# Patient Record
Sex: MALE | Race: WHITE
[De-identification: names, ages, dates, MRNs, and addresses within clinical notes are randomized per-mention and may not be internally consistent; named-entity substitution may affect disease eponyms.]

---

## 2019-03-25 ENCOUNTER — HOSPITAL ENCOUNTER (EMERGENCY)
Dept: HOSPITAL 7 - FB.ED | Age: 64
Discharge: HOME | End: 2019-03-25
Payer: COMMERCIAL

## 2019-03-25 DIAGNOSIS — I10: ICD-10-CM

## 2019-03-25 DIAGNOSIS — Z79.899: ICD-10-CM

## 2019-03-25 DIAGNOSIS — R07.89: Primary | ICD-10-CM

## 2019-03-25 DIAGNOSIS — E78.00: ICD-10-CM

## 2019-03-25 DIAGNOSIS — Z91.030: ICD-10-CM

## 2019-03-25 DIAGNOSIS — Z88.8: ICD-10-CM

## 2019-03-25 NOTE — EDM.PDOC
ED HPI GENERAL MEDICAL PROBLEM





- General


Chief Complaint: Chest Pain


Stated Complaint: CHEST PAIN


Time Seen by Provider: 03/25/19 16:04


Source of Information: Reports: Patient


History Limitations: Reports: No Limitations





- History of Present Illness


INITIAL COMMENTS - FREE TEXT/NARRATIVE: 





Awoke with right sided sharp burning chest pain, associated with slight 

shortness of breath, denies cough or injury, not pleuritic. No prior h/o CAD. +

FMHx CAD (Mother and Father with CAD in their 60's).


Onset: Today


Onset Date: 03/25/19


Onset Time: 06:00


Location: Reports: Chest (right)


Quality: Reports: Burning, Sharp


Severity: Moderate


Associated Symptoms: Reports: Shortness of Breath (slight)


  ** Right Chest


Pain Score (Numeric/FACES): 9





- Related Data


 Allergies











Allergy/AdvReac Type Severity Reaction Status Date / Time


 


bee venom protein (honey bee) Allergy  Anaphylactic Verified 03/25/19 15:55





   Shock  


 


flomax Allergy  Chest Pain Uncoded 03/25/19 15:55











Home Meds: 


 Home Meds





Fluticasone Propionate [Flonase] 1 spray NASBOTH DAILY PRN 10/19/14 [History]


atorvaSTATin [Lipitor] 40 mg PO BEDTIME 10/19/14 [History]


hydroCHLOROthiazide [Hydrochlorothiazide] 25 mg PO BID 10/19/14 [History]


Cyclobenzaprine [Flexeril] 10 mg PO TID PRN #20 tab 03/25/19 [Rx]


Naproxen 500 mg PO BID PRN #20 tablet 03/25/19 [Rx]











Past Medical History


Cardiovascular History: Reports: High Cholesterol, Hypertension.  Denies: CAD, 

MI





Social & Family History





- Tobacco Use


Smoking Status *Q: Never Smoker





ED ROS GENERAL





- Review of Systems


Review Of Systems: ROS reveals no pertinent complaints other than HPI.





ED EXAM, GENERAL





- Physical Exam


Exam: See Below


Exam Limited By: No Limitations


General Appearance: Alert, WD/WN, No Apparent Distress


Ears: Normal External Exam


Nose: Normal Inspection


Throat/Mouth: No Airway Compromise


Head: Atraumatic, Normocephalic


Neck: Full Range of Motion


Respiratory/Chest: No Respiratory Distress, Lungs Clear, Normal Breath Sounds, 

Other (moderate right sided chest wall tenderness)


Cardiovascular: Regular Rate, Rhythm, No Edema, No Murmur


GI/Abdominal: No Distention


Back Exam: Full Range of Motion


Extremities: Normal Range of Motion, Non-Tender, No Pedal Edema


Neurological: Alert, Normal Cognition, No Motor/Sensory Deficits


Psychiatric: Normal Affect, Normal Mood


Skin Exam: Warm, Dry, Intact





EKG INTERPRETATION


EKG Date: 03/25/19


Time: 15:50


Rhythm: NSR


Rate (Beats/Min): 72


Axis: Normal


P-Wave: Present


QRS: Normal


ST-T: Other (Borderline T wave abnormality)


QT: Normal


Comparison: Other: (EKG @19:08 shows no changes)





Course





- Vital Signs


Last Recorded V/S: 


 Last Vital Signs











Temp  36.4 C   03/25/19 15:56


 


Pulse  71   03/25/19 15:56


 


Resp  20   03/25/19 15:56


 


BP  121/85   03/25/19 15:56


 


Pulse Ox  100   03/25/19 15:56














- Orders/Labs/Meds


Orders: 


 Active Orders 24 hr











 Category Date Time Status


 


 EKG Documentation Completion [RC] ASDIRECTED Care  03/25/19 15:59 Active


 


 EKG Documentation Completion [RC] ASDIRECTED Care  03/25/19 19:00 Active


 


 CXR [Chest 1V Frontal] [CR] Stat Exams  03/25/19 15:58 Taken


 


 Sodium Chloride 0.9% [Saline Flush] Med  03/25/19 15:59 Active





 10 ml FLUSH ASDIRECTED PRN   


 


 Saline Lock Insert [OM.PC] Routine Oth  03/25/19 15:59 Ordered


 


 EKG 12 Lead [EK] Stat Ther  03/25/19 15:58 Ordered


 


 EKG 12 Lead [EK] Urgent Ther  03/25/19 19:00 Ordered








 Medication Orders





Sodium Chloride (Saline Flush)  10 ml FLUSH ASDIRECTED PRN


   PRN Reason: Keep Vein Open








Labs: 


 Laboratory Tests











  03/25/19 03/25/19 03/25/19 Range/Units





  16:20 16:20 16:20 


 


WBC  6.2    (4.5-12.0)  X10-3/uL


 


RBC  5.50    (4.30-5.75)  x10(6)uL


 


Hgb  17.4    (13.5-17.8)  g/dL


 


Hct  50.3    (30.0-51.3)  %


 


MCV  91.4    (80-96)  fL


 


MCH  31.7    (27.7-33.6)  pg


 


MCHC  34.7    (32.2-35.4)  g/dL


 


RDW  12.4    (11.5-15.5)  %


 


Plt Count  189    (125-369)  X10(3)uL


 


MPV  9.0    (7.4-10.4)  fL


 


Neut % (Auto)  47.4    (46-82)  %


 


Lymph % (Auto)  35.1    (13-37)  %


 


Mono % (Auto)  13.8 H    (4-12)  %


 


Eos % (Auto)  2    (1.0-5.0)  %


 


Baso % (Auto)  1    (0-2)  %


 


Neut # (Auto)  2.9    (1.6-8.3)  #


 


Lymph # (Auto)  2.2    (0.6-5.0)  #


 


Mono # (Auto)  0.9    (0.0-1.3)  #


 


Eos # (Auto)  0.1    (0.0-0.8)  #


 


Baso # (Auto)  0.1    (0.0-0.2)  #


 


PT   10.5   (8.7-11.1)  


 


INR   1.08   (0.89-1.13)  


 


APTT   24.5   (24.4-33.2)  SECONDS


 


D-Dimer, Quantitative   0.26   (0.0-0.59)  mg/LFEU


 


Sodium    139  (135-145)  mmol/L


 


Potassium    3.6  (3.5-5.3)  mmol/L


 


Chloride    98 L  (100-110)  mmol/L


 


Carbon Dioxide    31  (21-32)  mmol/L


 


BUN    20 H  (7-18)  mg/dL


 


Creatinine    1.1  (0.70-1.30)  mg/dL


 


Est Cr Clr Drug Dosing    TNP  


 


Estimated GFR (MDRD)    > 60  (>60)  


 


BUN/Creatinine Ratio    18.2  (9-20)  


 


Glucose    96  ()  mg/dL


 


Calcium    9.0  (8.6-10.2)  mg/dL


 


Total Bilirubin    0.9  (0.1-1.3)  mg/dL


 


AST    23  (5-25)  IU/L


 


ALT    42 H  (12-36)  U/L


 


Alkaline Phosphatase    78  ()  IU/L


 


Troponin I     (<0.017-0.056)  ng/mL


 


Total Protein    7.1  (6.0-8.0)  g/dL


 


Albumin    3.9  (3.2-4.6)  g/dL


 


Globulin    3.2  g/dL


 


Albumin/Globulin Ratio    1.2  














  03/25/19 03/25/19 Range/Units





  16:20 19:20 


 


WBC    (4.5-12.0)  X10-3/uL


 


RBC    (4.30-5.75)  x10(6)uL


 


Hgb    (13.5-17.8)  g/dL


 


Hct    (30.0-51.3)  %


 


MCV    (80-96)  fL


 


MCH    (27.7-33.6)  pg


 


MCHC    (32.2-35.4)  g/dL


 


RDW    (11.5-15.5)  %


 


Plt Count    (125-369)  X10(3)uL


 


MPV    (7.4-10.4)  fL


 


Neut % (Auto)    (46-82)  %


 


Lymph % (Auto)    (13-37)  %


 


Mono % (Auto)    (4-12)  %


 


Eos % (Auto)    (1.0-5.0)  %


 


Baso % (Auto)    (0-2)  %


 


Neut # (Auto)    (1.6-8.3)  #


 


Lymph # (Auto)    (0.6-5.0)  #


 


Mono # (Auto)    (0.0-1.3)  #


 


Eos # (Auto)    (0.0-0.8)  #


 


Baso # (Auto)    (0.0-0.2)  #


 


PT    (8.7-11.1)  


 


INR    (0.89-1.13)  


 


APTT    (24.4-33.2)  SECONDS


 


D-Dimer, Quantitative    (0.0-0.59)  mg/LFEU


 


Sodium    (135-145)  mmol/L


 


Potassium    (3.5-5.3)  mmol/L


 


Chloride    (100-110)  mmol/L


 


Carbon Dioxide    (21-32)  mmol/L


 


BUN    (7-18)  mg/dL


 


Creatinine    (0.70-1.30)  mg/dL


 


Est Cr Clr Drug Dosing    


 


Estimated GFR (MDRD)    (>60)  


 


BUN/Creatinine Ratio    (9-20)  


 


Glucose    ()  mg/dL


 


Calcium    (8.6-10.2)  mg/dL


 


Total Bilirubin    (0.1-1.3)  mg/dL


 


AST    (5-25)  IU/L


 


ALT    (12-36)  U/L


 


Alkaline Phosphatase    ()  IU/L


 


Troponin I  < 0.017 L  < 0.017 L  (<0.017-0.056)  ng/mL


 


Total Protein    (6.0-8.0)  g/dL


 


Albumin    (3.2-4.6)  g/dL


 


Globulin    g/dL


 


Albumin/Globulin Ratio    











Meds: 


Medications











Generic Name Dose Route Start Last Admin





  Trade Name Freq  PRN Reason Stop Dose Admin


 


Sodium Chloride  10 ml  03/25/19 15:59  





  Saline Flush  FLUSH   





  ASDIRECTED PRN   





  Keep Vein Open   





     





     





     














Discontinued Medications














Generic Name Dose Route Start Last Admin





  Trade Name Freq  PRN Reason Stop Dose Admin


 


Aspirin  324 mg  03/25/19 16:03  03/25/19 16:30





  Aspirin  PO  03/25/19 16:04  324 mg





  ONETIME ONE   Administration





     





     





     





     


 


Cyclobenzaprine HCl  10 mg  03/25/19 17:06  03/25/19 17:24





  Flexeril  PO  03/25/19 17:07  10 mg





  ONETIME ONE   Administration





     





     





     





     


 


Ketorolac Tromethamine  30 mg  03/25/19 16:03  03/25/19 16:30





  Toradol  IVPUSH  03/25/19 16:04  30 mg





  ONETIME ONE   Administration





     





     





     





     














- Radiology Interpretation


Free Text/Narrative:: 





CXR: No acute process. (ED provider interpretation)





- Re-Assessments/Exams


Free Text/Narrative Re-Assessment/Exam: 





03/25/19 17:07


Symptoms improved after Toradol 30mg IV and ASA 324mg PO


03/25/19 19:49


Symptoms recurred, right chest wall area very tender to palpation.





Departure





- Departure


Time of Disposition: 19:51


Disposition: Home, Self-Care 01


Condition: Good


Clinical Impression: 


 Chest wall pain





Prescriptions: 


Cyclobenzaprine [Flexeril] 10 mg PO TID PRN #20 tab


 PRN Reason: Muscle Spasm


Naproxen 500 mg PO BID PRN #20 tablet


 PRN Reason: Pain


Instructions:  Chest Wall Pain


Referrals: 


Griffin King MD [Primary Care Provider] - 2 Days


Forms:  ED Department Discharge


Additional Instructions: 


Fill prescriptions for Naproxen and Flexeril and take as directed. Rest. 

Alternate Ice and Heat. Follow up with your primary physician in 2 days. Return 

to the ER if symptoms worsen.





- My Orders


Last 24 Hours: 


My Active Orders





03/25/19 15:58


CXR [Chest 1V Frontal] [CR] Stat 


EKG 12 Lead [EK] Stat 





03/25/19 15:59


EKG Documentation Completion [RC] ASDIRECTED 


Sodium Chloride 0.9% [Saline Flush]   10 ml FLUSH ASDIRECTED PRN 


Saline Lock Insert [OM.PC] Routine 





03/25/19 19:00


EKG Documentation Completion [RC] ASDIRECTED 


EKG 12 Lead [EK] Urgent 














- Assessment/Plan


Last 24 Hours: 


My Active Orders





03/25/19 15:58


CXR [Chest 1V Frontal] [CR] Stat 


EKG 12 Lead [EK] Stat 





03/25/19 15:59


EKG Documentation Completion [RC] ASDIRECTED 


Sodium Chloride 0.9% [Saline Flush]   10 ml FLUSH ASDIRECTED PRN 


Saline Lock Insert [OM.PC] Routine 





03/25/19 19:00


EKG Documentation Completion [RC] ASDIRECTED 


EKG 12 Lead [EK] Urgent

## 2019-03-27 NOTE — CR
INDICATION:  Chest pain.



CHEST:  A portable AP upright view of the chest was obtained 03/25/19 - no 
comparisons.  



The heart appeared to be near the upper limits of normal in size but may be 
within normal limits.  The aorta is minimally calcified in the arch area.  



Overlying EKG leads are noted. 



An active infiltrate or effusion was not identified.  Although markings are 
slightly heavy at the lung bases and likely represent pulmonary fibrosis, it is 
difficult to entirely exclude minimal patchy bronchopneumonia.



Also noted are heavy markings in the apical lung, especially on the right, 
likely fibrotic in nature.  Pneumonia in the right apical area is difficult to 
entirely exclude also.  



IMPRESSION:

1.  No definite acute process but difficult to entirely exclude minimal areas 
of patchy pneumonia in the lung bases and the right apical area.

2.  Probable ASHD.

3.  Minimal dextroconvex scoliosis upper middle thoracic spine.  

4.  Probable pulmonary fibrosis.

MTDD

## 2019-11-12 ENCOUNTER — HOSPITAL ENCOUNTER (EMERGENCY)
Dept: HOSPITAL 7 - FB.ED | Age: 64
Discharge: HOME | End: 2019-11-12
Payer: COMMERCIAL

## 2019-11-12 VITALS — DIASTOLIC BLOOD PRESSURE: 78 MMHG | HEART RATE: 75 BPM | SYSTOLIC BLOOD PRESSURE: 124 MMHG

## 2019-11-12 DIAGNOSIS — J18.1: Primary | ICD-10-CM

## 2019-11-12 DIAGNOSIS — Z79.899: ICD-10-CM

## 2019-11-12 DIAGNOSIS — Z91.030: ICD-10-CM

## 2019-11-12 DIAGNOSIS — I10: ICD-10-CM

## 2019-11-12 DIAGNOSIS — Z88.8: ICD-10-CM

## 2019-11-12 DIAGNOSIS — E78.00: ICD-10-CM

## 2019-11-12 NOTE — EDM.PDOC
ED HPI GENERAL MEDICAL PROBLEM





- General


Chief Complaint: Chest Pain


Stated Complaint: CHEST PAIN


Time Seen by Provider: 11/12/19 13:37


Source of Information: Reports: Patient, Family, Old Records


History Limitations: Reports: No Limitations





- History of Present Illness


INITIAL COMMENTS - FREE TEXT/NARRATIVE: 





Raimundo comes into [facility] ED with relapses of atypical precordial chest pains, 

significance unknown. He has sporadic relapses that seem to migrate from L to R 

accross the chest anteriorly, sharp and disabling. At times he feels SOB, but 

denies palpitations, sweats, GI upset, lt headiness, or LOC. More recently sxs 

occurred following a half day of deer hunting this weekend while walking back 

to the vehicles. Sxs lasted several hours, but did subside without sequelae. 

There is no trauma hx. He has known deg Lumbar disc disease. He has taken no 

meds. Of interest is a strong FMS of CAD. He was seen by cardiologist about 17 

years ago, with no follow up. 





- Related Data


 Allergies











Allergy/AdvReac Type Severity Reaction Status Date / Time


 


bee venom protein (honey bee) Allergy  Anaphylactic Verified 03/25/19 15:55





   Shock  


 


flomax Allergy  Chest Pain Uncoded 03/25/19 15:55











Home Meds: 


 Home Meds





Fluticasone Propionate [Flonase] 1 spray NASBOTH DAILY PRN 10/19/14 [History]


atorvaSTATin [Lipitor] 40 mg PO BEDTIME 10/19/14 [History]


hydroCHLOROthiazide [Hydrochlorothiazide] 25 mg PO BID 10/19/14 [History]


Donepezil [Aricept] 5 mg PO BEDTIME 11/12/19 [History]


Hydrocodone/Acetaminophen [Hydrocodon-Acetaminophen 5-325] 1 tab PO Q6HR PRN 11/ 12/19 [History]


Levofloxacin [Levaquin] 500 mg PO DAILY #14 tablet 11/12/19 [Rx]


atorvaSTATin [Lipitor] 40 mg PO DAILY 11/12/19 [History]











Past Medical History


HEENT History: Reports: Allergic Rhinitis


Cardiovascular History: Reports: High Cholesterol, Hypertension.  Denies: CAD, 

MI


Other Cardiovascular History: patient states he has 50% blockage in one of his 

coronary arteries.


Respiratory History: Reports: None


Gastrointestinal History: Reports: None


Genitourinary History: Reports: Renal Calculus


Musculoskeletal History: Reports: Back Pain, Chronic


Neurological History: Reports: None


Psychiatric History: Reports: None


Endocrine/Metabolic History: Reports: None


Hematologic History: Reports: None


Immunologic History: Reports: None


Oncologic (Cancer) History: Reports: None


Dermatologic History: Reports: None





- Past Surgical History


Head Surgeries/Procedures: Reports: None


HEENT Surgical History: Reports: None


Respiratory Surgical History: Reports: None


GI Surgical History: Reports: None


Male  Surgical History: Reports: Kidney Stone Extraction


Endocrine Surgical History: Reports: None


Neurological Surgical History: Reports: None


Musculoskeletal Surgical History: Reports: None


Oncologic Surgical History: Reports: None


Dermatological Surgical History: Reports: None





ED ROS GENERAL





- Review of Systems


Review Of Systems: See Below


Constitutional: Reports: No Symptoms


HEENT: Reports: No Symptoms


Respiratory: Reports: Shortness of Breath (intermittent with chest pains)


Cardiovascular: Reports: Chest Pain


Endocrine: Reports: No Symptoms


GI/Abdominal: Reports: No Symptoms


: Reports: No Symptoms


Musculoskeletal: Reports: Back Pain (chronic back pain)


Skin: Reports: No Symptoms


Neurological: Reports: No Symptoms


Psychiatric: Reports: No Symptoms


Hematologic/Lymphatic: Reports: No Symptoms


Immunologic: Reports: No Symptoms





ED EXAM, GENERAL





- Physical Exam


Exam: See Below


Exam Limited By: No Limitations


General Appearance: Alert, WD/WN, No Apparent Distress, Anxious


Eye Exam: Bilateral Eye: EOMI, Normal Inspection, PERRL


Ears: Normal External Exam


Nose: Normal Inspection


Throat/Mouth: Normal Inspection, Normal Lips, Normal Teeth, Normal Gums, Normal 

Oropharynx, Normal Voice, No Airway Compromise


Head: Normocephalic


Neck: Normal Inspection, Supple, Non-Tender, Full Range of Motion


Respiratory/Chest: No Respiratory Distress, Normal Breath Sounds, No Accessory 

Muscle Use, Other (limited chest tenderness at xiphoid, compression test neg)


Cardiovascular: Normal Peripheral Pulses, Regular Rate, Rhythm, No Murmur


GI/Abdominal: Normal Bowel Sounds, Soft, Non-Tender, No Organomegaly, No 

Distention, No Mass


 (Male) Exam: Deferred


Rectal (Males) Exam: Deferred


Back Exam: Vertebral Tenderness (L2-5 R>L)


Extremities: Normal Inspection, Normal Range of Motion


Neurological: Alert, Oriented, CN II-XII Intact, Normal Cognition, No Motor/

Sensory Deficits


Psychiatric: Normal Affect, Anxious


Skin Exam: Warm, Dry, Intact


Lymphatic: No Adenopathy





Course





- Vital Signs


Text/Narrative:: 





Following assessment, a 12 lead EKG noted NSR, no acute changes. A subsequent 

frontal headache was reported, managed with Toradaol 30 mg IM. The screening 

labs and ekg were  baseline, with inflammatory markers negative. I reviewed the 

Chest CT angio which noted no PE, but did note pneumonic infiltrates in the R 

upper lobe, with some fluid in the R middle lobe fissue, signifcance unknown. A 

pneumonia is suspected, but an underlying disorder could not be ruled out at 

this time. Case was discussed with patient and spouse, and empiric treatment 

with Levaquin 500 mg qd will begin, with follow up with PCP on November 27th. A 

repeat CT Chest would be considered in 3-4 weeks. 


Last Recorded V/S: 


 Last Vital Signs











Temp  36.6 C   11/12/19 13:10


 


Pulse  75   11/12/19 13:10


 


Resp  14   11/12/19 13:10


 


BP  124/78   11/12/19 13:10


 


Pulse Ox  100   11/12/19 13:10














- Orders/Labs/Meds


Orders: 


 Active Orders 24 hr











 Category Date Time Status


 


 EKG Documentation Completion [RC] ASDIRECTED Care  11/12/19 13:39 Active


 


 Ang Chest [CT] Stat Exams  11/12/19 13:37 Taken


 


 EKG 12 Lead [EK] Routine Ther  11/12/19 13:37 Ordered











Labs: 


 Laboratory Tests











  11/12/19 11/12/19 11/12/19 Range/Units





  13:35 13:35 13:35 


 


WBC  6.8    (4.5-12.0)  X10-3/uL


 


RBC  5.05    (4.30-5.75)  x10(6)uL


 


Hgb  16.2    (13.5-17.8)  g/dL


 


Hct  46.8    (30.0-51.3)  %


 


MCV  92.8    (80-96)  fL


 


MCH  32.0    (27.7-33.6)  pg


 


MCHC  34.5    (32.2-35.4)  g/dL


 


RDW  12.4    (11.5-15.5)  %


 


Plt Count  172    (125-369)  X10(3)uL


 


MPV  8.6    (7.4-10.4)  fL


 


Neut % (Auto)  57.5    (46-82)  %


 


Lymph % (Auto)  27.0    (13-37)  %


 


Mono % (Auto)  12.0    (4-12)  %


 


Eos % (Auto)  3    (1.0-5.0)  %


 


Baso % (Auto)  1    (0-2)  %


 


Neut # (Auto)  4.0    (1.6-8.3)  #


 


Lymph # (Auto)  1.8    (0.6-5.0)  #


 


Mono # (Auto)  0.8    (0.0-1.3)  #


 


Eos # (Auto)  0.2    (0.0-0.8)  #


 


Baso # (Auto)  0.0    (0.0-0.2)  #


 


ESR     (0-15)  mm/hr


 


D-Dimer, Quantitative   0.21   (0.0-0.59)  mg/LFEU


 


Sodium    138  (135-145)  mmol/L


 


Potassium    3.0 L  (3.5-5.3)  mmol/L


 


Chloride    99 L  (100-110)  mmol/L


 


Carbon Dioxide    30  (21-32)  mmol/L


 


BUN    19 H  (7-18)  mg/dL


 


Creatinine    0.9  (0.70-1.30)  mg/dL


 


Est Cr Clr Drug Dosing    TNP  


 


Estimated GFR (MDRD)    > 60  (>60)  


 


BUN/Creatinine Ratio    21.1 H  (9-20)  


 


Glucose    104  ()  mg/dL


 


Lactic Acid     (0.4-2.2)  mmol/L


 


Calcium    9.3  (8.6-10.2)  mg/dL


 


Total Bilirubin    1.6 H  (0.1-1.3)  mg/dL


 


AST    25  (5-25)  IU/L


 


ALT    53 H D  (12-36)  U/L


 


Alkaline Phosphatase    78  ()  IU/L


 


Troponin I     (<0.017-0.056)  ng/mL


 


C-Reactive Protein     (0.5-0.9)  mg/dL


 


Total Protein    7.0  (6.0-8.0)  g/dL


 


Albumin    3.7  (3.2-4.6)  g/dL


 


Globulin    3.3  g/dL


 


Albumin/Globulin Ratio    1.1  














  11/12/19 11/12/19 11/12/19 Range/Units





  13:35 13:35 13:35 


 


WBC     (4.5-12.0)  X10-3/uL


 


RBC     (4.30-5.75)  x10(6)uL


 


Hgb     (13.5-17.8)  g/dL


 


Hct     (30.0-51.3)  %


 


MCV     (80-96)  fL


 


MCH     (27.7-33.6)  pg


 


MCHC     (32.2-35.4)  g/dL


 


RDW     (11.5-15.5)  %


 


Plt Count     (125-369)  X10(3)uL


 


MPV     (7.4-10.4)  fL


 


Neut % (Auto)     (46-82)  %


 


Lymph % (Auto)     (13-37)  %


 


Mono % (Auto)     (4-12)  %


 


Eos % (Auto)     (1.0-5.0)  %


 


Baso % (Auto)     (0-2)  %


 


Neut # (Auto)     (1.6-8.3)  #


 


Lymph # (Auto)     (0.6-5.0)  #


 


Mono # (Auto)     (0.0-1.3)  #


 


Eos # (Auto)     (0.0-0.8)  #


 


Baso # (Auto)     (0.0-0.2)  #


 


ESR   4   (0-15)  mm/hr


 


D-Dimer, Quantitative     (0.0-0.59)  mg/LFEU


 


Sodium     (135-145)  mmol/L


 


Potassium     (3.5-5.3)  mmol/L


 


Chloride     (100-110)  mmol/L


 


Carbon Dioxide     (21-32)  mmol/L


 


BUN     (7-18)  mg/dL


 


Creatinine     (0.70-1.30)  mg/dL


 


Est Cr Clr Drug Dosing     


 


Estimated GFR (MDRD)     (>60)  


 


BUN/Creatinine Ratio     (9-20)  


 


Glucose     ()  mg/dL


 


Lactic Acid    1.6  (0.4-2.2)  mmol/L


 


Calcium     (8.6-10.2)  mg/dL


 


Total Bilirubin     (0.1-1.3)  mg/dL


 


AST     (5-25)  IU/L


 


ALT     (12-36)  U/L


 


Alkaline Phosphatase     ()  IU/L


 


Troponin I  < 0.017 L    (<0.017-0.056)  ng/mL


 


C-Reactive Protein     (0.5-0.9)  mg/dL


 


Total Protein     (6.0-8.0)  g/dL


 


Albumin     (3.2-4.6)  g/dL


 


Globulin     g/dL


 


Albumin/Globulin Ratio     














  11/12/19 Range/Units





  13:35 


 


WBC   (4.5-12.0)  X10-3/uL


 


RBC   (4.30-5.75)  x10(6)uL


 


Hgb   (13.5-17.8)  g/dL


 


Hct   (30.0-51.3)  %


 


MCV   (80-96)  fL


 


MCH   (27.7-33.6)  pg


 


MCHC   (32.2-35.4)  g/dL


 


RDW   (11.5-15.5)  %


 


Plt Count   (125-369)  X10(3)uL


 


MPV   (7.4-10.4)  fL


 


Neut % (Auto)   (46-82)  %


 


Lymph % (Auto)   (13-37)  %


 


Mono % (Auto)   (4-12)  %


 


Eos % (Auto)   (1.0-5.0)  %


 


Baso % (Auto)   (0-2)  %


 


Neut # (Auto)   (1.6-8.3)  #


 


Lymph # (Auto)   (0.6-5.0)  #


 


Mono # (Auto)   (0.0-1.3)  #


 


Eos # (Auto)   (0.0-0.8)  #


 


Baso # (Auto)   (0.0-0.2)  #


 


ESR   (0-15)  mm/hr


 


D-Dimer, Quantitative   (0.0-0.59)  mg/LFEU


 


Sodium   (135-145)  mmol/L


 


Potassium   (3.5-5.3)  mmol/L


 


Chloride   (100-110)  mmol/L


 


Carbon Dioxide   (21-32)  mmol/L


 


BUN   (7-18)  mg/dL


 


Creatinine   (0.70-1.30)  mg/dL


 


Est Cr Clr Drug Dosing   


 


Estimated GFR (MDRD)   (>60)  


 


BUN/Creatinine Ratio   (9-20)  


 


Glucose   ()  mg/dL


 


Lactic Acid   (0.4-2.2)  mmol/L


 


Calcium   (8.6-10.2)  mg/dL


 


Total Bilirubin   (0.1-1.3)  mg/dL


 


AST   (5-25)  IU/L


 


ALT   (12-36)  U/L


 


Alkaline Phosphatase   ()  IU/L


 


Troponin I   (<0.017-0.056)  ng/mL


 


C-Reactive Protein  < 0.2 L  (0.5-0.9)  mg/dL


 


Total Protein   (6.0-8.0)  g/dL


 


Albumin   (3.2-4.6)  g/dL


 


Globulin   g/dL


 


Albumin/Globulin Ratio   











Meds: 


Medications














Discontinued Medications














Generic Name Dose Route Start Last Admin





  Trade Name Gunjan  PRN Reason Stop Dose Admin


 


Iopamidol  60 ml  11/12/19 14:16  11/12/19 14:25





  Isovue-370 (76%)  IV  11/12/19 14:17  58 ml





  .AS DIRECTED ONE   Administration





     





     





     





     


 


Ketorolac Tromethamine  30 mg  11/12/19 14:29  11/12/19 14:45





  Toradol  IM  11/12/19 14:30  30 mg





  ONETIME ONE   Administration





     





     





     





     














Departure





- Departure


Time of Disposition: 16:20


Disposition: Home, Self-Care 01


Condition: Fair


Clinical Impression: 


Pneumonia


Qualifiers:


 Pneumonia type: due to unspecified organism Laterality: right Lung location: 

upper lobe of lung Qualified Code(s): J18.9 - Pneumonia, unspecified organism





Prescriptions: 


Levofloxacin [Levaquin] 500 mg PO DAILY #14 tablet


Forms:  ED Department Discharge





- Problem List & Annotations


(1) Pneumonia


SNOMED Code(s): 072848085


   Code(s): J18.9 - PNEUMONIA, UNSPECIFIED ORGANISM   Status: Acute   Current 

Visit: Yes   Annotation/Comment:: Levaquin 500 mg qd x 14 days pending follow 

up with PCP on November 27th.    


Qualifiers: 


   Pneumonia type: due to unspecified organism   Laterality: right   Lung 

location: upper lobe of lung   Qualified Code(s): J18.9 - Pneumonia, 

unspecified organism   





(2) Chest wall pain


SNOMED Code(s): 211662463


   Code(s): R07.89 - OTHER CHEST PAIN   Status: Acute   Current Visit: No   

Annotation/Comment:: Atypical chest pain, may be related to pnuemonic 

infiltrates in R lung. Further consultation to be considered by PCP later this 

month.    





- Problem List Review


Problem List Initiated/Reviewed/Updated: Yes





- My Orders


Last 24 Hours: 


My Active Orders





11/12/19 13:37


Ang Chest [CT] Stat 


EKG 12 Lead [EK] Routine 





11/12/19 13:39


EKG Documentation Completion [RC] ASDIRECTED 














- Assessment/Plan


Last 24 Hours: 


My Active Orders





11/12/19 13:37


Ang Chest [CT] Stat 


EKG 12 Lead [EK] Routine 





11/12/19 13:39


EKG Documentation Completion [RC] ASDIRECTED 











Plan: 





Follow up with PCP.

## 2019-11-13 NOTE — CT
INDICATION:  Atypical chest pain, anterior upper chest.



CT ANGIOGRAPHY OF THE CHEST WITH CONTRAST:  Spiral 1.25 mm axial sections were 
obtained through the chest with sagittal and coronal reconstructions with 60 mL 
Isovue 370 at 3 mL/second, 11/12/19 - no comparisons.  Total exam DLP = 354.90 
mGy-cm.



Mediastinal lymphadenopathy is mild and nonspecific. 



No mediastinal mass was seen.



Extensive coronary artery calcification is noted.  The heart appears slightly 
enlarged.  Calcifications are noted in the arch of the aorta.  



Minimal scarring is noted at the left apex and moderately at the right apex.  
There appears to be patchy infiltration in the right apex extending inferiorly 
in the right upper lobe with infiltrate also noted in the apical superior 
segment of the right lower lobe minimally.  These areas of infiltrate likely 
are on the basis of pneumonia.  Somewhat prominent interstitial markings are 
noted extending into the lung bases, which may be on the basis of interstitial 
edema, although unusual infection cannot be entirely excluded with that 
appearance.  Fibrotic change could also be present.  



No gross consolidating pneumonia or definite nodular masses were identified.  
There is either some minimal infiltrate or minimal scarring in the middle lobe 
on the right anteriorly seen on axial images #68 and #69.  



There appears to be some renal calcinosis with tiny calculi.  



Degenerative changes and some disk disease are suggested in the thoracic spine 
with bridging hyperostosis from the upper middle through the lower thoracic 
spine.  Narrowing of disk spaces is seen.  No finding to suggest an acute 
fracture was identified.



IMPRESSION: 

1.  No evidence of PE.

2.  Infiltrative changes in the right upper lobe, most severe at the apical 
portion where there is pleural thickening.  Most likely this represents 
pneumonia, although malignancy is difficult to entirely exclude.  The patchy 
infiltration does extend into the more inferior right upper lobe but mainly is 
within the apical segment of the right upper lobe.  There also is some right 
lower lobe apical segment infiltrate, which may be related to the above 
infiltrate and may also represent pneumonia, as well as interstitial changes in 
both lung bases, which may be on the basis of fibrosis and/or unusual pneumonia
, such as a superimposed viral pneumonia.  A very minimal area of density - 
infiltrate may be present in the middle lobe on the right as a pneumonia or 
possibly minimal fibrotic change.

3.  Somewhat flattened diaphragm leaves suggest the possibility of 
emphysematous change - correlate clinically.

4.  ASHD/ASD.

5.  Degenerative changes and some disk disease in the thoracic spine.

6.  Renal calcinosis.

7.  Mild degree of mediastinal lymphadenopathy, which is nonspecific.

8.  Minimal renal cortical scarring.



Report was given in person to Dr. Wong at 1515 hours on 11/12/19.

[facility]